# Patient Record
Sex: FEMALE | Race: WHITE
[De-identification: names, ages, dates, MRNs, and addresses within clinical notes are randomized per-mention and may not be internally consistent; named-entity substitution may affect disease eponyms.]

---

## 2017-09-22 ENCOUNTER — HOSPITAL ENCOUNTER (OUTPATIENT)
Dept: HOSPITAL 53 - M LRY | Age: 22
End: 2017-09-22
Attending: ADVANCED PRACTICE MIDWIFE
Payer: COMMERCIAL

## 2017-09-22 DIAGNOSIS — Z3A.20: ICD-10-CM

## 2017-09-22 DIAGNOSIS — Z36: Primary | ICD-10-CM

## 2017-09-22 NOTE — REP
Obstetric ultrasound for fetal anatomy:

 

There is a single intrauterine gestation.  Fetal position is variable.  Fetal

heart rate is 100 fifth beats per minute.  Placenta is anterior.  There is no

placenta previa or abruptio.  Placenta is grade zero.

 

The amniotic fluid volume subjectively is normal.  The cervix measures 3.5 cm in

length.  Maternal adnexa and cul-de-sac are unremarkable.

 

By the ultrasound today gestational age is 20 weeks 6 days.  By LMP gestational

age is 20 weeks 6 days.  The NICHELLE is 02/03/2020.

 

Fetal weight is 392 grams (0 pounds, 13 ounces).  This is the 50th percentile for

20 weeks 6 days.

 

The following fetal anatomic structures are identified and are unremarkable:

 

Intracranial lateral ventricles, choroid plexus, cerebellum, cisterna magna,

nuchal fold, face, upper lip, lungs, four-chamber heart, cardiac right and left

ventricular outflow tracts, diaphragm, stomach, cord insertion, three-vessel

cord, kidneys, bladder, spine and upper lower extremities.

 

No fetal anomalies are identified.

 

 

Signed by

Cesar Acevedo MD 09/22/2017 03:59 P

## 2019-04-15 ENCOUNTER — HOSPITAL ENCOUNTER (OUTPATIENT)
Dept: HOSPITAL 53 - M LRY | Age: 24
End: 2019-04-15
Attending: PHYSICIAN ASSISTANT
Payer: COMMERCIAL

## 2019-04-15 DIAGNOSIS — Z13.29: Primary | ICD-10-CM

## 2019-04-15 LAB
ALBUMIN SERPL BCG-MCNC: 4.3 GM/DL (ref 3.2–5.2)
ALT SERPL W P-5'-P-CCNC: 30 U/L (ref 12–78)
BASOPHILS # BLD AUTO: 0 10^3/UL (ref 0–0.2)
BASOPHILS NFR BLD AUTO: 0.4 % (ref 0–1)
BILIRUB SERPL-MCNC: 0.4 MG/DL (ref 0.2–1)
BUN SERPL-MCNC: 13 MG/DL (ref 7–18)
CALCIUM SERPL-MCNC: 8.9 MG/DL (ref 8.5–10.1)
CHLORIDE SERPL-SCNC: 104 MEQ/L (ref 98–107)
CHOLEST SERPL-MCNC: 215 MG/DL (ref ?–200)
CHOLEST/HDLC SERPL: 3.98 {RATIO} (ref ?–5)
CO2 SERPL-SCNC: 31 MEQ/L (ref 21–32)
CREAT SERPL-MCNC: 0.8 MG/DL (ref 0.55–1.3)
EOSINOPHIL # BLD AUTO: 0.2 10^3/UL (ref 0–0.5)
EOSINOPHIL NFR BLD AUTO: 3.5 % (ref 0–3)
EST. AVERAGE GLUCOSE BLD GHB EST-MCNC: 114 MG/DL (ref 60–110)
GFR SERPL CREATININE-BSD FRML MDRD: > 60 ML/MIN/{1.73_M2} (ref 60–?)
GLUCOSE SERPL-MCNC: 78 MG/DL (ref 70–100)
HCT VFR BLD AUTO: 45.1 % (ref 36–47)
HDLC SERPL-MCNC: 54 MG/DL (ref 40–?)
HGB BLD-MCNC: 14.5 G/DL (ref 12–15.5)
LDLC SERPL CALC-MCNC: 123 MG/DL (ref ?–100)
LYMPHOCYTES # BLD AUTO: 2.1 10^3/UL (ref 1.5–6.5)
LYMPHOCYTES NFR BLD AUTO: 44.5 % (ref 24–44)
MCH RBC QN AUTO: 28.8 PG (ref 27–33)
MCHC RBC AUTO-ENTMCNC: 32.2 G/DL (ref 32–36.5)
MCV RBC AUTO: 89.5 FL (ref 80–96)
MONOCYTES # BLD AUTO: 0.5 10^3/UL (ref 0–0.8)
MONOCYTES NFR BLD AUTO: 10.2 % (ref 0–5)
NEUTROPHILS # BLD AUTO: 1.9 10^3/UL (ref 1.8–7.7)
NEUTROPHILS NFR BLD AUTO: 41.4 % (ref 36–66)
NONHDLC SERPL-MCNC: 161 MG/DL
PLATELET # BLD AUTO: 241 10^3/UL (ref 150–450)
POTASSIUM SERPL-SCNC: 4.7 MEQ/L (ref 3.5–5.1)
PROT SERPL-MCNC: 8.1 GM/DL (ref 6.4–8.2)
RBC # BLD AUTO: 5.04 10^6/UL (ref 4–5.4)
SODIUM SERPL-SCNC: 138 MEQ/L (ref 136–145)
T4 FREE SERPL-MCNC: 1.01 NG/DL (ref 0.76–1.46)
TRIGL SERPL-MCNC: 192 MG/DL (ref ?–150)
TSH SERPL DL<=0.005 MIU/L-ACNC: 0.33 UIU/ML (ref 0.36–3.74)
WBC # BLD AUTO: 4.6 10^3/UL (ref 4–10)